# Patient Record
Sex: FEMALE | Race: WHITE | URBAN - METROPOLITAN AREA
[De-identification: names, ages, dates, MRNs, and addresses within clinical notes are randomized per-mention and may not be internally consistent; named-entity substitution may affect disease eponyms.]

---

## 2024-07-30 PROBLEM — R42 LIGHTHEADEDNESS: Status: ACTIVE | Noted: 2024-07-30

## 2024-07-30 PROBLEM — R06.09 DYSPNEA ON EXERTION: Status: ACTIVE | Noted: 2024-07-30

## 2024-08-20 ENCOUNTER — APPOINTMENT (OUTPATIENT)
Dept: CARDIOLOGY | Facility: CLINIC | Age: 79
End: 2024-08-20
Payer: MEDICARE

## 2024-08-20 VITALS
HEIGHT: 67 IN | HEART RATE: 75 BPM | BODY MASS INDEX: 22.7 KG/M2 | OXYGEN SATURATION: 98 % | DIASTOLIC BLOOD PRESSURE: 80 MMHG | WEIGHT: 144.6 LBS | SYSTOLIC BLOOD PRESSURE: 140 MMHG

## 2024-08-20 DIAGNOSIS — R07.89 OTHER CHEST PAIN: Primary | ICD-10-CM

## 2024-08-20 DIAGNOSIS — R06.09 DYSPNEA ON EXERTION: ICD-10-CM

## 2024-08-20 PROCEDURE — 99204 OFFICE O/P NEW MOD 45 MIN: CPT | Performed by: INTERNAL MEDICINE

## 2024-08-20 PROCEDURE — 99214 OFFICE O/P EST MOD 30 MIN: CPT | Performed by: INTERNAL MEDICINE

## 2024-08-20 PROCEDURE — 93005 ELECTROCARDIOGRAM TRACING: CPT | Performed by: INTERNAL MEDICINE

## 2024-08-20 PROCEDURE — 1159F MED LIST DOCD IN RCRD: CPT | Performed by: INTERNAL MEDICINE

## 2024-08-20 PROCEDURE — 93010 ELECTROCARDIOGRAM REPORT: CPT | Performed by: INTERNAL MEDICINE

## 2024-08-20 RX ORDER — ALENDRONATE SODIUM 70 MG/1
70 TABLET ORAL
COMMUNITY
Start: 2024-04-24

## 2024-08-20 NOTE — LETTER
August 20, 2024     No Recipients    Patient: Liane Fonseca   YOB: 1945   Date of Visit: 8/20/2024       Dear Dr. oRgers Recipients:    Thank you for referring Liane Fonseca to me for evaluation. Below are my notes for this consultation.  If you have questions, please do not hesitate to call me. I look forward to following your patient along with you.       Sincerely,     Leander Coronado MD      CC: No Recipients  ______________________________________________________________________________________    [facility] New Patient History and Physical    Reason for referral: POSADA    HPI: Liane Fonseca is a 79 y.o.  female who presents today for POSADA.     Patient is a 79-year-old female with a history of osteoporosis who presents for further evaluation of chest discomfort, dyspnea on exertion and lightheadedness with exertion.  Patient's history begins in January 2024 where she developed an episode of left-sided chest heaviness lasting 30 minutes.  She had a primary care visit that week where her EKG was okay.  Since that time, she has been having dyspnea on exertion and lightheadedness with this exertion.  Walking does not cause her issues.  When she climbs steps or pushes herself past a certain point she gets pretty significant dyspnea on exertion.  Patient otherwise denies any palpitations, syncope, orthopnea, PND, lower extremity edema.    Of note, the patient's sister passed away from sudden cardiac death in June 2024.    8/20/2024 ECG demonstrates normal sinus rhythm, otherwise normal.    Past Medical History:   She has no past medical history on file.    Surgical History:   She has no past surgical history on file.    Family History:   No family history on file.    Allergies:  Patient has no allergy information on record.     Social History:   Former smoker, no alcohol or drugs    Prior Cardiovascular Testing (personally reviewed):     Review of Systems:  Review of Systems   All other  "systems reviewed and are negative.      Objective    Outpatient Medications:  No current outpatient medications on file.     Last Recorded Vitals  There were no vitals taken for this visit.    Physical Exam:  Physical Exam  Vitals reviewed.   Constitutional:       Appearance: Normal appearance.   HENT:      Head: Normocephalic and atraumatic.      Mouth/Throat:      Mouth: Mucous membranes are moist.      Pharynx: Oropharynx is clear.   Eyes:      Extraocular Movements: Extraocular movements intact.      Conjunctiva/sclera: Conjunctivae normal.   Cardiovascular:      Rate and Rhythm: Normal rate and regular rhythm.      Pulses: Normal pulses.      Heart sounds: Normal heart sounds.   Pulmonary:      Effort: Pulmonary effort is normal.      Breath sounds: Normal breath sounds.   Abdominal:      General: Bowel sounds are normal.      Palpations: Abdomen is soft.   Musculoskeletal:         General: No swelling.      Cervical back: Neck supple.   Skin:     General: Skin is warm and dry.   Neurological:      General: No focal deficit present.      Mental Status: She is alert.   Psychiatric:         Mood and Affect: Mood normal.         Behavior: Behavior normal.         Lab Review:    No results found for: \"GLUCOSE\", \"CALCIUM\", \"NA\", \"K\", \"CO2\", \"CL\", \"BUN\", \"CREATININE\"    No results found for: \"WBC\", \"HGB\", \"HCT\", \"MCV\", \"PLT\"    No results found for: \"CHOL\"  No results found for: \"HDL\"  No results found for: \"LDLCALC\"  No results found for: \"TRIG\"  No components found for: \"CHOLHDL\"    No results found for: \"BNP\"    No results found for: \"TSH\"    Assessment:   Chest pain, dyspnea on exertion and lightheadedness with exertion of unclear etiology.  History is suspicious for for ischemic heart disease.    Physical exam is fairly benign.  Will go ahead and check an echocardiogram to evaluate LV function and make sure there are not valvular abnormalities causing her symptoms.    Recommend noninvasive stress testing to " evaluate for ischemia.    Patient will follow-up with me in 6 weeks or sooner if she has more problems.    Leander Coronado MD

## 2024-08-20 NOTE — LETTER
August 20, 2024     Aakash Coker MD  [address] [facility] [address]    Patient: Liane Fonseca   YOB: 1945   Date of Visit: 8/20/2024       Dear Dr. Aakash Coker MD:    Thank you for referring Liane Fonseca to me for evaluation. Below are my notes for this consultation.  If you have questions, please do not hesitate to call me. I look forward to following your patient along with you.       Sincerely,     Leander Coronado MD      CC: No Recipients  ______________________________________________________________________________________    [facility] New Patient History and Physical    Reason for referral: POSADA    HPI: Liane Fonseca is a 79 y.o.  female who presents today for POSADA.     Patient is a 79-year-old female with a history of osteoporosis who presents for further evaluation of chest discomfort, dyspnea on exertion and lightheadedness with exertion.  Patient's history begins in January 2024 where she developed an episode of left-sided chest heaviness lasting 30 minutes.  She had a primary care visit that week where her EKG was okay.  Since that time, she has been having dyspnea on exertion and lightheadedness with this exertion.  Walking does not cause her issues.  When she climbs steps or pushes herself past a certain point she gets pretty significant dyspnea on exertion.  Patient otherwise denies any palpitations, syncope, orthopnea, PND, lower extremity edema.    Of note, the patient's sister passed away from sudden cardiac death in June 2024.    8/20/2024 ECG demonstrates normal sinus rhythm, otherwise normal.    Past Medical History:   She has no past medical history on file.    Surgical History:   She has no past surgical history on file.    Family History:   No family history on file.    Allergies:  Patient has no allergy information on record.     Social History:   Former smoker, no alcohol or drugs    Prior Cardiovascular Testing (personally  "reviewed):     Review of Systems:  Review of Systems   All other systems reviewed and are negative.      Objective    Outpatient Medications:  No current outpatient medications on file.     Last Recorded Vitals  There were no vitals taken for this visit.    Physical Exam:  Physical Exam  Vitals reviewed.   Constitutional:       Appearance: Normal appearance.   HENT:      Head: Normocephalic and atraumatic.      Mouth/Throat:      Mouth: Mucous membranes are moist.      Pharynx: Oropharynx is clear.   Eyes:      Extraocular Movements: Extraocular movements intact.      Conjunctiva/sclera: Conjunctivae normal.   Cardiovascular:      Rate and Rhythm: Normal rate and regular rhythm.      Pulses: Normal pulses.      Heart sounds: Normal heart sounds.   Pulmonary:      Effort: Pulmonary effort is normal.      Breath sounds: Normal breath sounds.   Abdominal:      General: Bowel sounds are normal.      Palpations: Abdomen is soft.   Musculoskeletal:         General: No swelling.      Cervical back: Neck supple.   Skin:     General: Skin is warm and dry.   Neurological:      General: No focal deficit present.      Mental Status: She is alert.   Psychiatric:         Mood and Affect: Mood normal.         Behavior: Behavior normal.         Lab Review:    No results found for: \"GLUCOSE\", \"CALCIUM\", \"NA\", \"K\", \"CO2\", \"CL\", \"BUN\", \"CREATININE\"    No results found for: \"WBC\", \"HGB\", \"HCT\", \"MCV\", \"PLT\"    No results found for: \"CHOL\"  No results found for: \"HDL\"  No results found for: \"LDLCALC\"  No results found for: \"TRIG\"  No components found for: \"CHOLHDL\"    No results found for: \"BNP\"    No results found for: \"TSH\"    Assessment:   Chest pain, dyspnea on exertion and lightheadedness with exertion of unclear etiology.  History is suspicious for for ischemic heart disease.    Physical exam is fairly benign.  Will go ahead and check an echocardiogram to evaluate LV function and make sure there are not valvular abnormalities " causing her symptoms.    Recommend noninvasive stress testing to evaluate for ischemia.    Patient will follow-up with me in 6 weeks or sooner if she has more problems.    Leander Coronado MD

## 2024-08-20 NOTE — LETTER
October 22, 2024     Pooja Toro MD  [address] [facility]  [address]  [facility] [address]    Patient: Liane Fonseca   YOB: 1945   Date of Visit: 8/20/2024       Dear Dr. Pooja Toro MD:    Thank you for referring Liane Fonseca to me for evaluation. Below are my notes for this consultation.  If you have questions, please do not hesitate to call me. I look forward to following your patient along with you.       Sincerely,     Leander Coronado MD      CC: No Recipients  ______________________________________________________________________________________     Cardiology New Patient History and Physical    Reason for referral: POSADA    HPI: Liane Fonseca is a 79 y.o.  female who presents today for POSADA.     Patient is a 79-year-old female with a history of osteoporosis who presents for further evaluation of chest discomfort, dyspnea on exertion and lightheadedness with exertion.  Patient's history begins in January 2024 where she developed an episode of left-sided chest heaviness lasting 30 minutes.  She had a primary care visit that week where her EKG was okay.  Since that time, she has been having dyspnea on exertion and lightheadedness with this exertion.  Walking does not cause her issues.  When she climbs steps or pushes herself past a certain point she gets pretty significant dyspnea on exertion.  Patient otherwise denies any palpitations, syncope, orthopnea, PND, lower extremity edema.    Of note, the patient's sister passed away from sudden cardiac death in June 2024.    8/20/2024 ECG demonstrates normal sinus rhythm, otherwise normal.    Past Medical History:   She has no past medical history on file.    Surgical History:   She has no past surgical history on file.    Family History:   No family history on file.    Allergies:  Patient has no allergy information on record.     Social History:   Former smoker, no alcohol or drugs    Prior Cardiovascular Testing  "(personally reviewed):     Review of Systems:  Review of Systems   All other systems reviewed and are negative.      Objective    Outpatient Medications:  No current outpatient medications on file.     Last Recorded Vitals  There were no vitals taken for this visit.    Physical Exam:  Physical Exam  Vitals reviewed.   Constitutional:       Appearance: Normal appearance.   HENT:      Head: Normocephalic and atraumatic.      Mouth/Throat:      Mouth: Mucous membranes are moist.      Pharynx: Oropharynx is clear.   Eyes:      Extraocular Movements: Extraocular movements intact.      Conjunctiva/sclera: Conjunctivae normal.   Cardiovascular:      Rate and Rhythm: Normal rate and regular rhythm.      Pulses: Normal pulses.      Heart sounds: Normal heart sounds.   Pulmonary:      Effort: Pulmonary effort is normal.      Breath sounds: Normal breath sounds.   Abdominal:      General: Bowel sounds are normal.      Palpations: Abdomen is soft.   Musculoskeletal:         General: No swelling.      Cervical back: Neck supple.   Skin:     General: Skin is warm and dry.   Neurological:      General: No focal deficit present.      Mental Status: She is alert.   Psychiatric:         Mood and Affect: Mood normal.         Behavior: Behavior normal.         Lab Review:    No results found for: \"GLUCOSE\", \"CALCIUM\", \"NA\", \"K\", \"CO2\", \"CL\", \"BUN\", \"CREATININE\"    No results found for: \"WBC\", \"HGB\", \"HCT\", \"MCV\", \"PLT\"    No results found for: \"CHOL\"  No results found for: \"HDL\"  No results found for: \"LDLCALC\"  No results found for: \"TRIG\"  No components found for: \"CHOLHDL\"    No results found for: \"BNP\"    No results found for: \"TSH\"    Assessment:   Chest pain, dyspnea on exertion and lightheadedness with exertion of unclear etiology.  History is suspicious for for ischemic heart disease.    Physical exam is fairly benign.  Will go ahead and check an echocardiogram to evaluate LV function and make sure there are not valvular " abnormalities causing her symptoms.    Recommend noninvasive stress testing to evaluate for ischemia.    Patient will follow-up with me in 6 weeks or sooner if she has more problems.    Leander Coronado MD

## 2024-08-20 NOTE — PROGRESS NOTES
Cardiology New Patient History and Physical    Reason for referral: POSADA    HPI: Liane Fonseca is a 79 y.o.  female who presents today for POSADA.     Patient is a 79-year-old female with a history of osteoporosis who presents for further evaluation of chest discomfort, dyspnea on exertion and lightheadedness with exertion.  Patient's history begins in January 2024 where she developed an episode of left-sided chest heaviness lasting 30 minutes.  She had a primary care visit that week where her EKG was okay.  Since that time, she has been having dyspnea on exertion and lightheadedness with this exertion.  Walking does not cause her issues.  When she climbs steps or pushes herself past a certain point she gets pretty significant dyspnea on exertion.  Patient otherwise denies any palpitations, syncope, orthopnea, PND, lower extremity edema.    Of note, the patient's sister passed away from sudden cardiac death in June 2024.    8/20/2024 ECG demonstrates normal sinus rhythm, otherwise normal.    Past Medical History:   She has no past medical history on file.    Surgical History:   She has no past surgical history on file.    Family History:   No family history on file.    Allergies:  Patient has no allergy information on record.     Social History:   Former smoker, no alcohol or drugs    Prior Cardiovascular Testing (personally reviewed):     Review of Systems:  Review of Systems   All other systems reviewed and are negative.      Objective     Outpatient Medications:  No current outpatient medications on file.     Last Recorded Vitals  There were no vitals taken for this visit.    Physical Exam:  Physical Exam  Vitals reviewed.   Constitutional:       Appearance: Normal appearance.   HENT:      Head: Normocephalic and atraumatic.      Mouth/Throat:      Mouth: Mucous membranes are moist.      Pharynx: Oropharynx is clear.   Eyes:      Extraocular Movements: Extraocular movements intact.      Conjunctiva/sclera:  "Conjunctivae normal.   Cardiovascular:      Rate and Rhythm: Normal rate and regular rhythm.      Pulses: Normal pulses.      Heart sounds: Normal heart sounds.   Pulmonary:      Effort: Pulmonary effort is normal.      Breath sounds: Normal breath sounds.   Abdominal:      General: Bowel sounds are normal.      Palpations: Abdomen is soft.   Musculoskeletal:         General: No swelling.      Cervical back: Neck supple.   Skin:     General: Skin is warm and dry.   Neurological:      General: No focal deficit present.      Mental Status: She is alert.   Psychiatric:         Mood and Affect: Mood normal.         Behavior: Behavior normal.         Lab Review:    No results found for: \"GLUCOSE\", \"CALCIUM\", \"NA\", \"K\", \"CO2\", \"CL\", \"BUN\", \"CREATININE\"    No results found for: \"WBC\", \"HGB\", \"HCT\", \"MCV\", \"PLT\"    No results found for: \"CHOL\"  No results found for: \"HDL\"  No results found for: \"LDLCALC\"  No results found for: \"TRIG\"  No components found for: \"CHOLHDL\"    No results found for: \"BNP\"    No results found for: \"TSH\"    Assessment:   Chest pain, dyspnea on exertion and lightheadedness with exertion of unclear etiology.  History is suspicious for for ischemic heart disease.    Physical exam is fairly benign.  Will go ahead and check an echocardiogram to evaluate LV function and make sure there are not valvular abnormalities causing her symptoms.    Recommend noninvasive stress testing to evaluate for ischemia.    Patient will follow-up with me in 6 weeks or sooner if she has more problems.    Leander Coronado MD      "

## 2024-09-06 ENCOUNTER — APPOINTMENT (OUTPATIENT)
Dept: CARDIOLOGY | Facility: CLINIC | Age: 79
End: 2024-09-06
Payer: MEDICARE

## 2024-09-13 ENCOUNTER — HOSPITAL ENCOUNTER (OUTPATIENT)
Dept: CARDIOLOGY | Facility: CLINIC | Age: 79
Discharge: HOME | End: 2024-09-13
Payer: MEDICARE

## 2024-09-13 DIAGNOSIS — R06.09 DYSPNEA ON EXERTION: ICD-10-CM

## 2024-09-13 DIAGNOSIS — R07.89 OTHER CHEST PAIN: ICD-10-CM

## 2024-09-13 PROCEDURE — 93017 CV STRESS TEST TRACING ONLY: CPT

## 2024-10-18 ENCOUNTER — APPOINTMENT (OUTPATIENT)
Dept: CARDIOLOGY | Facility: CLINIC | Age: 79
End: 2024-10-18
Payer: MEDICARE

## 2024-10-18 ENCOUNTER — HOSPITAL ENCOUNTER (OUTPATIENT)
Dept: CARDIOLOGY | Facility: CLINIC | Age: 79
Discharge: HOME | End: 2024-10-18
Payer: MEDICARE

## 2024-10-18 DIAGNOSIS — R06.09 DYSPNEA ON EXERTION: ICD-10-CM

## 2024-10-18 DIAGNOSIS — R07.89 OTHER CHEST PAIN: ICD-10-CM

## 2024-10-21 LAB
AORTIC VALVE MEAN GRADIENT: 3 MMHG
AORTIC VALVE PEAK VELOCITY: 1.32 M/S
AV PEAK GRADIENT: 6.9 MMHG
AVA (PEAK VEL): 2.66 CM2
AVA (VTI): 2.93 CM2
EJECTION FRACTION APICAL 4 CHAMBER: 58.3
EJECTION FRACTION: 58 %
LEFT ATRIUM VOLUME AREA LENGTH INDEX BSA: 29.9 ML/M2
LEFT VENTRICLE INTERNAL DIMENSION DIASTOLE: 4.53 CM (ref 3.5–6)
LEFT VENTRICULAR OUTFLOW TRACT DIAMETER: 1.96 CM
RIGHT VENTRICLE FREE WALL PEAK S': 0.14 CM/S
RIGHT VENTRICLE PEAK SYSTOLIC PRESSURE: 26.9 MMHG
TRICUSPID ANNULAR PLANE SYSTOLIC EXCURSION: 1.9 CM

## 2024-10-23 ENCOUNTER — TELEPHONE (OUTPATIENT)
Dept: CARDIOLOGY | Facility: CLINIC | Age: 79
End: 2024-10-23
Payer: MEDICARE

## 2024-10-23 NOTE — TELEPHONE ENCOUNTER
Echo reads:  Aorta: The aortic root is abnormal. There is mild dilatation of the ascending aorta. The aortic root is at the upper limits of normal size.

## 2024-10-23 NOTE — TELEPHONE ENCOUNTER
Pt called in asking for more clarification on her results  Echo showed large aorta  Wondering if it is okay with waiting to see the doc until 11-26  Does have a family history of AAA (father), does she need to be checked?

## 2024-11-15 ENCOUNTER — OFFICE VISIT (OUTPATIENT)
Dept: CARDIOLOGY | Facility: CLINIC | Age: 79
End: 2024-11-15
Payer: MEDICARE

## 2024-11-15 VITALS
OXYGEN SATURATION: 99 % | HEART RATE: 79 BPM | HEIGHT: 67 IN | DIASTOLIC BLOOD PRESSURE: 76 MMHG | WEIGHT: 147 LBS | SYSTOLIC BLOOD PRESSURE: 128 MMHG | BODY MASS INDEX: 23.07 KG/M2

## 2024-11-15 DIAGNOSIS — R06.09 DYSPNEA ON EXERTION: Primary | ICD-10-CM

## 2024-11-15 PROCEDURE — 99213 OFFICE O/P EST LOW 20 MIN: CPT | Performed by: INTERNAL MEDICINE

## 2024-11-15 PROCEDURE — 1159F MED LIST DOCD IN RCRD: CPT | Performed by: INTERNAL MEDICINE

## 2024-11-15 NOTE — PROGRESS NOTES
[facility] Cardiology Outpatient Follow-up Visit     Reason for referral: POSADA     HPI: Liane Fonseca is a 79 y.o.  female who presents today for POSADA.      Patient is a 79-year-old female with a history of osteoporosis who presents for further evaluation of chest discomfort, dyspnea on exertion and lightheadedness with exertion.  Patient's history begins in January 2024 where she developed an episode of left-sided chest heaviness lasting 30 minutes.  She had a primary care visit that week where her EKG was okay.  Since that time, she has been having dyspnea on exertion and lightheadedness with this exertion.  Walking does not cause her issues.  When she climbs steps or pushes herself past a certain point she gets pretty significant dyspnea on exertion.  Patient otherwise denies any palpitations, syncope, orthopnea, PND, lower extremity edema.     Of note, the patient's sister passed away from sudden cardiac death in June 2024.     8/20/2024 ECG demonstrates normal sinus rhythm, otherwise normal.  9/13/2024 treadmill stress test: Normal stress test, no clinical or electrocardiographic evidence for ischemia at a maximum workload.  Reached 4 minutes and 39 seconds on the Bang protocol achieving 93% of maximum predicted heart rate.  10/18/2024 echo: Ejection fraction 55 to 60%, impaired relaxation.    Past Medical History:   She has no past medical history on file.    Surgical History:   She has no past surgical history on file.    Family History:   No family history on file.    Allergies:  Patient has no known allergies.     Social History:   Former smoker, no alcohol or drugs     Prior Cardiovascular Testing (Personally Reviewed):     Review of Systems:  Review of Systems   All other systems reviewed and are negative.      Outpatient Medications:    Current Outpatient Medications:     alendronate (Fosamax) 70 mg tablet, Take 1 tablet (70 mg) by mouth every 7 days., Disp: , Rfl:      Last Recorded Vitals  There  "were no vitals taken for this visit.    Physical Exam:    Physical Exam  Vitals reviewed.   Constitutional:       Appearance: Normal appearance.   HENT:      Head: Normocephalic and atraumatic.      Mouth/Throat:      Mouth: Mucous membranes are moist.      Pharynx: Oropharynx is clear.   Eyes:      Extraocular Movements: Extraocular movements intact.      Conjunctiva/sclera: Conjunctivae normal.   Cardiovascular:      Rate and Rhythm: Normal rate and regular rhythm.      Pulses: Normal pulses.      Heart sounds: Normal heart sounds.   Pulmonary:      Effort: Pulmonary effort is normal.      Breath sounds: Normal breath sounds.   Abdominal:      General: Bowel sounds are normal.      Palpations: Abdomen is soft.   Musculoskeletal:         General: No swelling.      Cervical back: Neck supple.   Skin:     General: Skin is warm and dry.   Neurological:      General: No focal deficit present.      Mental Status: She is alert.   Psychiatric:         Mood and Affect: Mood normal.         Behavior: Behavior normal.         Lab/Radiology/Diagnostic Review:    Labs    No results found for: \"GLUCOSE\", \"CALCIUM\", \"NA\", \"K\", \"CO2\", \"CL\", \"BUN\", \"CREATININE\"    No results found for: \"WBC\", \"HGB\", \"HCT\", \"MCV\", \"PLT\"    No results found for: \"CHOL\"  No results found for: \"HDL\"  No results found for: \"LDLCALC\"  No results found for: \"TRIG\"  No components found for: \"CHOLHDL\"    No results found for: \"BNP\"    No results found for: \"TSH\"    Assessment:   EKG stress test is negative for ischemia.  Echocardiogram demonstrates normal LV function.    Patient does believe that there is a component of deconditioning involved.  I asked her to start a walking program every day.     Patient will follow-up with us in 6 months or sooner if she has more problems.    Leander Coronado MD      "

## 2024-11-15 NOTE — LETTER
November 15, 2024     No Recipients    Patient: Liane Fonseca   YOB: 1945   Date of Visit: 11/15/2024       Dear Dr. Rogers Recipients:    Thank you for referring Liane Fonseca to me for evaluation. Below are my notes for this consultation.  If you have questions, please do not hesitate to call me. I look forward to following your patient along with you.       Sincerely,     Leander Coronado MD      CC: No Recipients  ______________________________________________________________________________________        [facility] Cardiology Outpatient Follow-up Visit     Reason for referral: POSADA     HPI: Liane Fonseca is a 79 y.o.  female who presents today for POSADA.      Patient is a 79-year-old female with a history of osteoporosis who presents for further evaluation of chest discomfort, dyspnea on exertion and lightheadedness with exertion.  Patient's history begins in January 2024 where she developed an episode of left-sided chest heaviness lasting 30 minutes.  She had a primary care visit that week where her EKG was okay.  Since that time, she has been having dyspnea on exertion and lightheadedness with this exertion.  Walking does not cause her issues.  When she climbs steps or pushes herself past a certain point she gets pretty significant dyspnea on exertion.  Patient otherwise denies any palpitations, syncope, orthopnea, PND, lower extremity edema.     Of note, the patient's sister passed away from sudden cardiac death in June 2024.     8/20/2024 ECG demonstrates normal sinus rhythm, otherwise normal.  9/13/2024 treadmill stress test: Normal stress test, no clinical or electrocardiographic evidence for ischemia at a maximum workload.  Reached 4 minutes and 39 seconds on the Bang protocol achieving 93% of maximum predicted heart rate.  10/18/2024 echo: Ejection fraction 55 to 60%, impaired relaxation.    Past Medical History:   She has no past medical history on file.    Surgical History:   She  "has no past surgical history on file.    Family History:   No family history on file.    Allergies:  Patient has no known allergies.     Social History:   Former smoker, no alcohol or drugs     Prior Cardiovascular Testing (Personally Reviewed):     Review of Systems:  Review of Systems   All other systems reviewed and are negative.      Outpatient Medications:    Current Outpatient Medications:   •  alendronate (Fosamax) 70 mg tablet, Take 1 tablet (70 mg) by mouth every 7 days., Disp: , Rfl:      Last Recorded Vitals  There were no vitals taken for this visit.    Physical Exam:    Physical Exam  Vitals reviewed.   Constitutional:       Appearance: Normal appearance.   HENT:      Head: Normocephalic and atraumatic.      Mouth/Throat:      Mouth: Mucous membranes are moist.      Pharynx: Oropharynx is clear.   Eyes:      Extraocular Movements: Extraocular movements intact.      Conjunctiva/sclera: Conjunctivae normal.   Cardiovascular:      Rate and Rhythm: Normal rate and regular rhythm.      Pulses: Normal pulses.      Heart sounds: Normal heart sounds.   Pulmonary:      Effort: Pulmonary effort is normal.      Breath sounds: Normal breath sounds.   Abdominal:      General: Bowel sounds are normal.      Palpations: Abdomen is soft.   Musculoskeletal:         General: No swelling.      Cervical back: Neck supple.   Skin:     General: Skin is warm and dry.   Neurological:      General: No focal deficit present.      Mental Status: She is alert.   Psychiatric:         Mood and Affect: Mood normal.         Behavior: Behavior normal.         Lab/Radiology/Diagnostic Review:    Labs    No results found for: \"GLUCOSE\", \"CALCIUM\", \"NA\", \"K\", \"CO2\", \"CL\", \"BUN\", \"CREATININE\"    No results found for: \"WBC\", \"HGB\", \"HCT\", \"MCV\", \"PLT\"    No results found for: \"CHOL\"  No results found for: \"HDL\"  No results found for: \"LDLCALC\"  No results found for: \"TRIG\"  No components found for: \"CHOLHDL\"    No results found for: " "\"BNP\"    No results found for: \"TSH\"    Assessment:   EKG stress test is negative for ischemia.  Echocardiogram demonstrates normal LV function.    Patient does believe that there is a component of deconditioning involved.  I asked her to start a walking program every day.     Patient will follow-up with us in 6 months or sooner if she has more problems.    Leander Coronado MD    "

## 2024-11-15 NOTE — LETTER
November 15, 2024     Pooja Toro MD  [address]  [address]  [facility] [address]    Patient: Liane Fonseca   YOB: 1945   Date of Visit: 11/15/2024       Dear Dr. Pooja Toro MD:    Thank you for referring Liane Fonseca to me for evaluation. Below are my notes for this consultation.  If you have questions, please do not hesitate to call me. I look forward to following your patient along with you.       Sincerely,     Leander Coronado MD      CC: No Recipients  ______________________________________________________________________________________        [facility] Cardiology Outpatient Follow-up Visit     Reason for referral: POSADA     HPI: Liane Fonseca is a 79 y.o.  female who presents today for POSADA.      Patient is a 79-year-old female with a history of osteoporosis who presents for further evaluation of chest discomfort, dyspnea on exertion and lightheadedness with exertion.  Patient's history begins in January 2024 where she developed an episode of left-sided chest heaviness lasting 30 minutes.  She had a primary care visit that week where her EKG was okay.  Since that time, she has been having dyspnea on exertion and lightheadedness with this exertion.  Walking does not cause her issues.  When she climbs steps or pushes herself past a certain point she gets pretty significant dyspnea on exertion.  Patient otherwise denies any palpitations, syncope, orthopnea, PND, lower extremity edema.     Of note, the patient's sister passed away from sudden cardiac death in June 2024.     8/20/2024 ECG demonstrates normal sinus rhythm, otherwise normal.  9/13/2024 treadmill stress test: Normal stress test, no clinical or electrocardiographic evidence for ischemia at a maximum workload.  Reached 4 minutes and 39 seconds on the Bang protocol achieving 93% of maximum predicted heart rate.  10/18/2024 echo: Ejection fraction 55 to 60%, impaired relaxation.    Past Medical  "History:   She has no past medical history on file.    Surgical History:   She has no past surgical history on file.    Family History:   No family history on file.    Allergies:  Patient has no known allergies.     Social History:   Former smoker, no alcohol or drugs     Prior Cardiovascular Testing (Personally Reviewed):     Review of Systems:  Review of Systems   All other systems reviewed and are negative.      Outpatient Medications:    Current Outpatient Medications:   •  alendronate (Fosamax) 70 mg tablet, Take 1 tablet (70 mg) by mouth every 7 days., Disp: , Rfl:      Last Recorded Vitals  There were no vitals taken for this visit.    Physical Exam:    Physical Exam  Vitals reviewed.   Constitutional:       Appearance: Normal appearance.   HENT:      Head: Normocephalic and atraumatic.      Mouth/Throat:      Mouth: Mucous membranes are moist.      Pharynx: Oropharynx is clear.   Eyes:      Extraocular Movements: Extraocular movements intact.      Conjunctiva/sclera: Conjunctivae normal.   Cardiovascular:      Rate and Rhythm: Normal rate and regular rhythm.      Pulses: Normal pulses.      Heart sounds: Normal heart sounds.   Pulmonary:      Effort: Pulmonary effort is normal.      Breath sounds: Normal breath sounds.   Abdominal:      General: Bowel sounds are normal.      Palpations: Abdomen is soft.   Musculoskeletal:         General: No swelling.      Cervical back: Neck supple.   Skin:     General: Skin is warm and dry.   Neurological:      General: No focal deficit present.      Mental Status: She is alert.   Psychiatric:         Mood and Affect: Mood normal.         Behavior: Behavior normal.         Lab/Radiology/Diagnostic Review:    Labs    No results found for: \"GLUCOSE\", \"CALCIUM\", \"NA\", \"K\", \"CO2\", \"CL\", \"BUN\", \"CREATININE\"    No results found for: \"WBC\", \"HGB\", \"HCT\", \"MCV\", \"PLT\"    No results found for: \"CHOL\"  No results found for: \"HDL\"  No results found for: \"LDLCALC\"  No results found " "for: \"TRIG\"  No components found for: \"CHOLHDL\"    No results found for: \"BNP\"    No results found for: \"TSH\"    Assessment:   EKG stress test is negative for ischemia.  Echocardiogram demonstrates normal LV function.    Patient does believe that there is a component of deconditioning involved.  I asked her to start a walking program every day.     Patient will follow-up with us in 6 months or sooner if she has more problems.    Leander Coronado MD      "

## 2024-11-26 ENCOUNTER — APPOINTMENT (OUTPATIENT)
Dept: CARDIOLOGY | Facility: CLINIC | Age: 79
End: 2024-11-26
Payer: MEDICARE

## 2025-08-15 ENCOUNTER — OFFICE VISIT (OUTPATIENT)
Dept: CARDIOLOGY | Facility: CLINIC | Age: 80
End: 2025-08-15
Payer: MEDICARE

## 2025-08-15 VITALS
HEIGHT: 67 IN | SYSTOLIC BLOOD PRESSURE: 142 MMHG | OXYGEN SATURATION: 96 % | WEIGHT: 149 LBS | DIASTOLIC BLOOD PRESSURE: 80 MMHG | BODY MASS INDEX: 23.39 KG/M2 | HEART RATE: 69 BPM

## 2025-08-15 DIAGNOSIS — R00.2 PALPITATIONS: ICD-10-CM

## 2025-08-15 DIAGNOSIS — Z71.89 CARDIAC RISK COUNSELING: Primary | ICD-10-CM

## 2025-08-15 DIAGNOSIS — Z82.49 FAMILY HISTORY OF AORTIC ANEURYSM: ICD-10-CM

## 2025-08-15 DIAGNOSIS — Z87.891 FORMER SMOKER: ICD-10-CM

## 2025-08-15 LAB
ATRIAL RATE: 69 BPM
P AXIS: 80 DEGREES
P OFFSET: 187 MS
P ONSET: 128 MS
PR INTERVAL: 186 MS
Q ONSET: 221 MS
QRS COUNT: 11 BEATS
QRS DURATION: 76 MS
QT INTERVAL: 412 MS
QTC CALCULATION(BAZETT): 441 MS
QTC FREDERICIA: 431 MS
R AXIS: 66 DEGREES
T AXIS: 70 DEGREES
T OFFSET: 427 MS
VENTRICULAR RATE: 69 BPM

## 2025-08-15 PROCEDURE — 1159F MED LIST DOCD IN RCRD: CPT | Performed by: STUDENT IN AN ORGANIZED HEALTH CARE EDUCATION/TRAINING PROGRAM

## 2025-08-15 PROCEDURE — 93005 ELECTROCARDIOGRAM TRACING: CPT | Performed by: STUDENT IN AN ORGANIZED HEALTH CARE EDUCATION/TRAINING PROGRAM

## 2025-08-15 PROCEDURE — 1160F RVW MEDS BY RX/DR IN RCRD: CPT | Performed by: STUDENT IN AN ORGANIZED HEALTH CARE EDUCATION/TRAINING PROGRAM

## 2025-08-15 PROCEDURE — 99214 OFFICE O/P EST MOD 30 MIN: CPT | Performed by: STUDENT IN AN ORGANIZED HEALTH CARE EDUCATION/TRAINING PROGRAM

## 2025-08-15 PROCEDURE — 99212 OFFICE O/P EST SF 10 MIN: CPT

## 2025-08-15 ASSESSMENT — ENCOUNTER SYMPTOMS
NEAR-SYNCOPE: 0
ORTHOPNEA: 0
RESPIRATORY NEGATIVE: 1
MUSCULOSKELETAL NEGATIVE: 1
CONSTITUTIONAL NEGATIVE: 1
PSYCHIATRIC NEGATIVE: 1
GASTROINTESTINAL NEGATIVE: 1
HEMATOLOGIC/LYMPHATIC NEGATIVE: 1
ENDOCRINE NEGATIVE: 1
PALPITATIONS: 0
SYNCOPE: 0
ALLERGIC/IMMUNOLOGIC NEGATIVE: 1
EYES NEGATIVE: 1
DYSPNEA ON EXERTION: 1
NEUROLOGICAL NEGATIVE: 1
PND: 0

## 2025-09-02 ENCOUNTER — HOSPITAL ENCOUNTER (OUTPATIENT)
Dept: RADIOLOGY | Facility: HOSPITAL | Age: 80
Discharge: HOME | End: 2025-09-02
Payer: MEDICARE

## 2025-09-02 DIAGNOSIS — Z82.49 FAMILY HISTORY OF AORTIC ANEURYSM: ICD-10-CM

## 2025-09-02 DIAGNOSIS — Z87.891 FORMER SMOKER: ICD-10-CM

## 2025-09-02 PROCEDURE — 75571 CT HRT W/O DYE W/CA TEST: CPT

## 2025-09-03 ENCOUNTER — HOSPITAL ENCOUNTER (OUTPATIENT)
Dept: VASCULAR MEDICINE | Facility: CLINIC | Age: 80
Discharge: HOME | End: 2025-09-03
Payer: MEDICARE

## 2025-09-03 DIAGNOSIS — Z13.6 ENCOUNTER FOR SCREENING FOR CARDIOVASCULAR DISORDERS: ICD-10-CM

## 2025-09-03 DIAGNOSIS — Z82.49 FAMILY HISTORY OF AORTIC ANEURYSM: ICD-10-CM

## 2025-09-03 DIAGNOSIS — Z87.891 FORMER SMOKER: ICD-10-CM

## 2025-09-03 PROCEDURE — 76706 US ABDL AORTA SCREEN AAA: CPT | Performed by: SURGERY

## 2025-09-03 PROCEDURE — 76706 US ABDL AORTA SCREEN AAA: CPT

## 2025-09-05 LAB
CHOLEST SERPL-MCNC: 196 MG/DL
CHOLEST/HDLC SERPL: 2.9 (CALC)
HDLC SERPL-MCNC: 68 MG/DL
LDLC SERPL CALC-MCNC: 113 MG/DL (CALC)
NONHDLC SERPL-MCNC: 128 MG/DL (CALC)
TRIGL SERPL-MCNC: 63 MG/DL